# Patient Record
Sex: FEMALE
[De-identification: names, ages, dates, MRNs, and addresses within clinical notes are randomized per-mention and may not be internally consistent; named-entity substitution may affect disease eponyms.]

---

## 2019-02-28 ENCOUNTER — HOSPITAL ENCOUNTER (EMERGENCY)
Dept: HOSPITAL 46 - ED | Age: 64
Discharge: HOME | End: 2019-02-28
Payer: COMMERCIAL

## 2019-02-28 VITALS — WEIGHT: 145 LBS | BODY MASS INDEX: 25.69 KG/M2 | HEIGHT: 63 IN

## 2019-02-28 DIAGNOSIS — G47.9: Primary | ICD-10-CM

## 2019-02-28 NOTE — XMS
PreManage Notification: JUAN MANUEL EDWARD MRN:[id number]
 
Security Information
 
Security Events
No recent Security Events currently on file
 
 
 
CRITERIA MET
------------
- PDM
 
 
CARE PROVIDERS
-------------------------------------------------------------------------------------
ELIJAH FELICIANO     Primary Care     Current
 
PHONE: Unknown
-------------------------------------------------------------------------------------
 
Brian has no Care Guidelines for this patient.
 
E.[facility] VISIT COUNT (12 MO.)
-------------------------------------------------------------------------------------
[address]
 
1 [facility]
-------------------------------------------------------------------------------------
TOTAL 4
-------------------------------------------------------------------------------------
NOTE: Visits indicate total known visits.
 
ED/UCC VISIT TRACKING (12 MO.)
-------------------------------------------------------------------------------------
02/28/2019 16:49
[facility]
 
TYPE: Emergency
 
COMPLAINT:
- CAN'T SLEEP
-------------------------------------------------------------------------------------
11/28/2018 10:35
[facility] OR
 
TYPE: Emergency
 
DIAGNOSES:
- LEFT LEG SWELLING AND PAIN
- Pain in left ankle and joints of left foot
- Other acute osteomyelitis, left ankle and foot
-------------------------------------------------------------------------------------
09/07/2018 16:52
[facility]
 
TYPE: Emergency
 
 
DIAGNOSES:
- Sepsis, unspecified organism
- Type 2 diabetes mellitus with diabetic polyneuropathy
- Type 2 diabetes mellitus with diabetic peripheral angiopathy with gangrene
- Cellulitis of right lower limb
- Dependence on renal dialysis
- Long term (current) use of insulin
- Cellulitis of left lower limb
- Leg Pain
- Referral
- End stage renal disease
-------------------------------------------------------------------------------------
09/02/2018 17:02
[facility]
 
TYPE: Emergency
 
COMPLAINT:
- KNEE PAIN
-------------------------------------------------------------------------------------
 
 
INPATIENT VISIT TRACKING (12 MO.)
-------------------------------------------------------------------------------------
11/28/2018 17:59
[facility]
 
TYPE: Medical Surgical
 
COMPLAINT:
- ANKLE PAIN
 
DIAGNOSES:
0. Pain in left ankle and joints of left foot
1. 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
2. End stage renal disease
3. 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
chronic kidney disease, or end stage renal disease
4. Cellulitis of left lower limb
5. Chronic diastolic (congestive) heart failure
6. Type 2 diabetes mellitus with diabetic chronic kidney disease
7. Peripheral vascular disease, unspecified
8. Type 2 diabetes mellitus with diabetic neuropathic arthropathy
9. Other malaise
10. Other disorders of phosphorus metabolism
11. Other disorders of plasma-protein metabolism, not elsewhere classified
12. Anemia in chronic kidney disease
13. Methicillin susceptible Staphylococcus aureus infection as the cause of diseases
classified elsewhere
14. Long term (current) use of oral hypoglycemic drugs
15. Patient's noncompliance with renal dialysis
16. Dependence on renal dialysis
17. Long term (current) use of aspirin
-------------------------------------------------------------------------------------
09/07/2018 16:52
[facility]
 
TYPE: General Medicine
 
 
DIAGNOSES:
- Sepsis, unspecified organism
- Anemia in chronic kidney disease
- Essential (primary) hypertension
- Cutaneous abscess of limb, unspecified
- Cellulitis of right lower limb
- End stage renal disease
- Other disorders of plasma-protein metabolism, not elsewhere classified
- Long term (current) use of insulin
- Bacteremia
- Type 2 diabetes mellitus with diabetic polyneuropathy
- Type 2 diabetes mellitus with diabetic peripheral angiopathy with gangrene
- Cellulitis of unspecified part of limb
- Dependence on renal dialysis
- Cellulitis of left lower limb
-------------------------------------------------------------------------------------
 
[email or web address]